# Patient Record
Sex: MALE | Race: OTHER | NOT HISPANIC OR LATINO | URBAN - METROPOLITAN AREA
[De-identification: names, ages, dates, MRNs, and addresses within clinical notes are randomized per-mention and may not be internally consistent; named-entity substitution may affect disease eponyms.]

---

## 2023-05-03 ENCOUNTER — EMERGENCY (EMERGENCY)
Facility: HOSPITAL | Age: 38
LOS: 1 days | Discharge: HOME | End: 2023-05-03
Attending: EMERGENCY MEDICINE | Admitting: EMERGENCY MEDICINE
Payer: SELF-PAY

## 2023-05-03 VITALS
DIASTOLIC BLOOD PRESSURE: 80 MMHG | HEART RATE: 79 BPM | SYSTOLIC BLOOD PRESSURE: 128 MMHG | TEMPERATURE: 98 F | RESPIRATION RATE: 17 BRPM | OXYGEN SATURATION: 99 %

## 2023-05-03 VITALS
HEART RATE: 95 BPM | DIASTOLIC BLOOD PRESSURE: 89 MMHG | SYSTOLIC BLOOD PRESSURE: 140 MMHG | OXYGEN SATURATION: 93 % | HEIGHT: 77 IN | RESPIRATION RATE: 18 BRPM | WEIGHT: 287.04 LBS | TEMPERATURE: 98 F

## 2023-05-03 LAB
ALBUMIN SERPL ELPH-MCNC: 3.7 G/DL — SIGNIFICANT CHANGE UP (ref 3.3–5)
ALP SERPL-CCNC: 64 U/L — SIGNIFICANT CHANGE UP (ref 40–120)
ALT FLD-CCNC: 32 U/L — SIGNIFICANT CHANGE UP (ref 10–45)
ANION GAP SERPL CALC-SCNC: 7 MMOL/L — SIGNIFICANT CHANGE UP (ref 5–17)
APPEARANCE UR: CLEAR — SIGNIFICANT CHANGE UP
AST SERPL-CCNC: 39 U/L — SIGNIFICANT CHANGE UP (ref 10–40)
B-OH-BUTYR SERPL-SCNC: 0.1 MMOL/L — SIGNIFICANT CHANGE UP
BASE EXCESS BLDV CALC-SCNC: 5.7 MMOL/L — HIGH (ref -2–3)
BASOPHILS # BLD AUTO: 0.02 K/UL — SIGNIFICANT CHANGE UP (ref 0–0.2)
BASOPHILS NFR BLD AUTO: 0.4 % — SIGNIFICANT CHANGE UP (ref 0–2)
BILIRUB SERPL-MCNC: 0.9 MG/DL — SIGNIFICANT CHANGE UP (ref 0.2–1.2)
BILIRUB UR-MCNC: NEGATIVE — SIGNIFICANT CHANGE UP
BUN SERPL-MCNC: 8 MG/DL — SIGNIFICANT CHANGE UP (ref 7–23)
CA-I SERPL-SCNC: 1.22 MMOL/L — SIGNIFICANT CHANGE UP (ref 1.15–1.33)
CALCIUM SERPL-MCNC: 9.2 MG/DL — SIGNIFICANT CHANGE UP (ref 8.4–10.5)
CHLORIDE SERPL-SCNC: 100 MMOL/L — SIGNIFICANT CHANGE UP (ref 96–108)
CO2 BLDV-SCNC: 33.7 MMOL/L — HIGH (ref 22–26)
CO2 SERPL-SCNC: 28 MMOL/L — SIGNIFICANT CHANGE UP (ref 22–31)
COLOR SPEC: YELLOW — SIGNIFICANT CHANGE UP
CREAT SERPL-MCNC: 0.93 MG/DL — SIGNIFICANT CHANGE UP (ref 0.5–1.3)
DIFF PNL FLD: NEGATIVE — SIGNIFICANT CHANGE UP
EGFR: 109 ML/MIN/1.73M2 — SIGNIFICANT CHANGE UP
EOSINOPHIL # BLD AUTO: 0.08 K/UL — SIGNIFICANT CHANGE UP (ref 0–0.5)
EOSINOPHIL NFR BLD AUTO: 1.5 % — SIGNIFICANT CHANGE UP (ref 0–6)
GAS PNL BLDV: 134 MMOL/L — LOW (ref 136–145)
GAS PNL BLDV: SIGNIFICANT CHANGE UP
GLUCOSE SERPL-MCNC: 328 MG/DL — HIGH (ref 70–99)
GLUCOSE UR QL: >=1000
HCO3 BLDV-SCNC: 32 MMOL/L — HIGH (ref 22–29)
HCT VFR BLD CALC: 35.4 % — LOW (ref 39–50)
HGB BLD-MCNC: 11.3 G/DL — LOW (ref 13–17)
IMM GRANULOCYTES NFR BLD AUTO: 0.2 % — SIGNIFICANT CHANGE UP (ref 0–0.9)
KETONES UR-MCNC: NEGATIVE — SIGNIFICANT CHANGE UP
LEUKOCYTE ESTERASE UR-ACNC: NEGATIVE — SIGNIFICANT CHANGE UP
LYMPHOCYTES # BLD AUTO: 2.14 K/UL — SIGNIFICANT CHANGE UP (ref 1–3.3)
LYMPHOCYTES # BLD AUTO: 39.4 % — SIGNIFICANT CHANGE UP (ref 13–44)
MCHC RBC-ENTMCNC: 29.3 PG — SIGNIFICANT CHANGE UP (ref 27–34)
MCHC RBC-ENTMCNC: 31.9 GM/DL — LOW (ref 32–36)
MCV RBC AUTO: 91.7 FL — SIGNIFICANT CHANGE UP (ref 80–100)
MONOCYTES # BLD AUTO: 0.34 K/UL — SIGNIFICANT CHANGE UP (ref 0–0.9)
MONOCYTES NFR BLD AUTO: 6.3 % — SIGNIFICANT CHANGE UP (ref 2–14)
NEUTROPHILS # BLD AUTO: 2.84 K/UL — SIGNIFICANT CHANGE UP (ref 1.8–7.4)
NEUTROPHILS NFR BLD AUTO: 52.2 % — SIGNIFICANT CHANGE UP (ref 43–77)
NITRITE UR-MCNC: NEGATIVE — SIGNIFICANT CHANGE UP
NRBC # BLD: 0 /100 WBCS — SIGNIFICANT CHANGE UP (ref 0–0)
PCO2 BLDV: 53 MMHG — SIGNIFICANT CHANGE UP (ref 42–55)
PH BLDV: 7.39 — SIGNIFICANT CHANGE UP (ref 7.32–7.43)
PH UR: 6 — SIGNIFICANT CHANGE UP (ref 5–8)
PLATELET # BLD AUTO: 216 K/UL — SIGNIFICANT CHANGE UP (ref 150–400)
PO2 BLDV: 27 MMHG — SIGNIFICANT CHANGE UP (ref 25–45)
POTASSIUM BLDV-SCNC: 3.7 MMOL/L — SIGNIFICANT CHANGE UP (ref 3.5–5.1)
POTASSIUM SERPL-MCNC: 3.8 MMOL/L — SIGNIFICANT CHANGE UP (ref 3.5–5.3)
POTASSIUM SERPL-SCNC: 3.8 MMOL/L — SIGNIFICANT CHANGE UP (ref 3.5–5.3)
PROT SERPL-MCNC: 7.7 G/DL — SIGNIFICANT CHANGE UP (ref 6–8.3)
PROT UR-MCNC: NEGATIVE MG/DL — SIGNIFICANT CHANGE UP
RBC # BLD: 3.86 M/UL — LOW (ref 4.2–5.8)
RBC # FLD: 13.1 % — SIGNIFICANT CHANGE UP (ref 10.3–14.5)
SAO2 % BLDV: SIGNIFICANT CHANGE UP % (ref 67–88)
SODIUM SERPL-SCNC: 135 MMOL/L — SIGNIFICANT CHANGE UP (ref 135–145)
SP GR SPEC: 1.02 — SIGNIFICANT CHANGE UP (ref 1–1.03)
UROBILINOGEN FLD QL: 0.2 E.U./DL — SIGNIFICANT CHANGE UP
WBC # BLD: 5.43 K/UL — SIGNIFICANT CHANGE UP (ref 3.8–10.5)
WBC # FLD AUTO: 5.43 K/UL — SIGNIFICANT CHANGE UP (ref 3.8–10.5)

## 2023-05-03 PROCEDURE — 82330 ASSAY OF CALCIUM: CPT

## 2023-05-03 PROCEDURE — 81003 URINALYSIS AUTO W/O SCOPE: CPT

## 2023-05-03 PROCEDURE — 99284 EMERGENCY DEPT VISIT MOD MDM: CPT

## 2023-05-03 PROCEDURE — 84295 ASSAY OF SERUM SODIUM: CPT

## 2023-05-03 PROCEDURE — 84132 ASSAY OF SERUM POTASSIUM: CPT

## 2023-05-03 PROCEDURE — 36415 COLL VENOUS BLD VENIPUNCTURE: CPT

## 2023-05-03 PROCEDURE — 87086 URINE CULTURE/COLONY COUNT: CPT

## 2023-05-03 PROCEDURE — 82010 KETONE BODYS QUAN: CPT

## 2023-05-03 PROCEDURE — 85025 COMPLETE CBC W/AUTO DIFF WBC: CPT

## 2023-05-03 PROCEDURE — 82803 BLOOD GASES ANY COMBINATION: CPT

## 2023-05-03 PROCEDURE — 80053 COMPREHEN METABOLIC PANEL: CPT

## 2023-05-03 PROCEDURE — 99283 EMERGENCY DEPT VISIT LOW MDM: CPT

## 2023-05-03 PROCEDURE — 82962 GLUCOSE BLOOD TEST: CPT

## 2023-05-03 RX ORDER — SODIUM CHLORIDE 9 MG/ML
1000 INJECTION INTRAMUSCULAR; INTRAVENOUS; SUBCUTANEOUS ONCE
Refills: 0 | Status: COMPLETED | OUTPATIENT
Start: 2023-05-03 | End: 2023-05-03

## 2023-05-03 RX ADMIN — SODIUM CHLORIDE 1000 MILLILITER(S): 9 INJECTION INTRAMUSCULAR; INTRAVENOUS; SUBCUTANEOUS at 19:11

## 2023-05-03 NOTE — ED PROVIDER NOTE - PROGRESS NOTE DETAILS
[place]: Hgb 11.3, glucose 328, other labs (including pH, bicarb, AG, BHB) wnl. UA w/ no ketones or infection. Feeling much better.

## 2023-05-03 NOTE — ED PROVIDER NOTE - NSFOLLOWUPINSTRUCTIONS_ED_ALL_ED_FT
Stay well hydrated.    Follow up with an endocrinologist. Can also call  to make an appointment.     Return for fevers, persistent vomit, worsening pain, worsening breathing, worsening lightheaded.    Follow up with primary doctor within 1-2 days.     Take your home medications!    Hyperglycemia    Hyperglycemia occurs when the glucose (sugar) level in your blood is too high. Symptoms include increased urination, increased thirst, a dry mouth, or changes in appetite. If started on a medication, take exactly as prescribed by your health care professional. Maintain a healthy lifestyle and follow up with your primary care physician.    SEEK IMMEDIATE MEDICAL CARE IF YOU HAVE ANY OF THE FOLLOWING SYMPTOMS: shortness of breath, change in mental status, nausea or vomiting, fruity smell to your breath, or any signs of dehydration.

## 2023-05-03 NOTE — ED PROVIDER NOTE - CLINICAL SUMMARY MEDICAL DECISION MAKING FREE TEXT BOX
38M PMH DM p/w high sugar. Pt states his sugar has been high. also states he feels shaky and is very hungry. No other systemic symptoms.   Sat 93%, other vitals wnl. Exam as above. Very well appearing.  ddx: Likely hyperglycemia, low suspicion DKA.   Clinically no specific toxidrome or withdrawal syndrome.   Labs, IVF, reassess.

## 2023-05-03 NOTE — ED ADULT NURSE REASSESSMENT NOTE - NS ED NURSE REASSESS COMMENT FT1
Patient made aware of return precautions verbalized understanding. Ambulated well without assistance.

## 2023-05-03 NOTE — ED PROVIDER NOTE - OBJECTIVE STATEMENT
38M PMH DM p/w high sugar. Pt states his sugar has been high. also states he feels shaky and is very hungry. No other systemic symptoms.   Etoh ~once a week, smokes, MJ.   Denies abd pain, fevers, chills, nausea, vomiting, diarrhea, black stool, bloody stool, dysuria, hematuria, urinary frequency, focal weakness, focal numbness, lightheadedness, back pain, SOB, CP, rhinorrhea, nasal congestion, sore throat, cough.

## 2023-05-03 NOTE — ED PROVIDER NOTE - PHYSICAL EXAMINATION
R eye has lateral gaze deviation - pt states this is his baseline  No clonus, rigidity, tremors, fasciculations. PERRL. Strength 5/5. Steady unassisted gait. Normal bowel sounds, skin temp/color.  Has old sticker marker on R AC (likely from recent IV)

## 2023-05-03 NOTE — ED PROVIDER NOTE - PATIENT PORTAL LINK FT
You can access the FollowMyHealth Patient Portal offered by [facility] by registering at the following website: [email or web address]. By joining "[employer]’s FollowMyHealth portal, you will also be able to view your health information using other applications (apps) compatible with our system.

## 2023-05-05 DIAGNOSIS — Z88.0 ALLERGY STATUS TO PENICILLIN: ICD-10-CM

## 2023-05-05 DIAGNOSIS — E11.65 TYPE 2 DIABETES MELLITUS WITH HYPERGLYCEMIA: ICD-10-CM

## 2023-05-05 DIAGNOSIS — R73.9 HYPERGLYCEMIA, UNSPECIFIED: ICD-10-CM

## 2023-05-05 LAB
CULTURE RESULTS: NO GROWTH — SIGNIFICANT CHANGE UP
SPECIMEN SOURCE: SIGNIFICANT CHANGE UP

## 2023-05-15 ENCOUNTER — EMERGENCY (EMERGENCY)
Facility: HOSPITAL | Age: 38
LOS: 1 days | Discharge: HOME | End: 2023-05-15
Attending: EMERGENCY MEDICINE | Admitting: EMERGENCY MEDICINE
Payer: SELF-PAY

## 2023-05-15 VITALS
HEART RATE: 88 BPM | RESPIRATION RATE: 18 BRPM | WEIGHT: 278 LBS | SYSTOLIC BLOOD PRESSURE: 155 MMHG | HEIGHT: 77 IN | OXYGEN SATURATION: 98 % | DIASTOLIC BLOOD PRESSURE: 102 MMHG | TEMPERATURE: 98 F

## 2023-05-15 VITALS
SYSTOLIC BLOOD PRESSURE: 139 MMHG | HEART RATE: 81 BPM | TEMPERATURE: 98 F | RESPIRATION RATE: 18 BRPM | DIASTOLIC BLOOD PRESSURE: 89 MMHG | OXYGEN SATURATION: 98 %

## 2023-05-15 DIAGNOSIS — E11.65 TYPE 2 DIABETES MELLITUS WITH HYPERGLYCEMIA: ICD-10-CM

## 2023-05-15 DIAGNOSIS — Z88.0 ALLERGY STATUS TO PENICILLIN: ICD-10-CM

## 2023-05-15 DIAGNOSIS — Z76.0 ENCOUNTER FOR ISSUE OF REPEAT PRESCRIPTION: ICD-10-CM

## 2023-05-15 DIAGNOSIS — T38.3X6A UNDERDOSING OF INSULIN AND ORAL HYPOGLYCEMIC [ANTIDIABETIC] DRUGS, INITIAL ENCOUNTER: ICD-10-CM

## 2023-05-15 LAB
ALBUMIN SERPL ELPH-MCNC: 3.7 G/DL — SIGNIFICANT CHANGE UP (ref 3.3–5)
ALP SERPL-CCNC: 62 U/L — SIGNIFICANT CHANGE UP (ref 40–120)
ALT FLD-CCNC: 57 U/L — HIGH (ref 10–45)
ANION GAP SERPL CALC-SCNC: 11 MMOL/L — SIGNIFICANT CHANGE UP (ref 5–17)
AST SERPL-CCNC: 62 U/L — HIGH (ref 10–40)
B-OH-BUTYR SERPL-SCNC: 0.3 MMOL/L — SIGNIFICANT CHANGE UP
BASE EXCESS BLDV CALC-SCNC: -0.8 MMOL/L — SIGNIFICANT CHANGE UP (ref -2–3)
BASOPHILS # BLD AUTO: 0.01 K/UL — SIGNIFICANT CHANGE UP (ref 0–0.2)
BASOPHILS NFR BLD AUTO: 0.2 % — SIGNIFICANT CHANGE UP (ref 0–2)
BILIRUB SERPL-MCNC: 0.5 MG/DL — SIGNIFICANT CHANGE UP (ref 0.2–1.2)
BUN SERPL-MCNC: 10 MG/DL — SIGNIFICANT CHANGE UP (ref 7–23)
CALCIUM SERPL-MCNC: 9 MG/DL — SIGNIFICANT CHANGE UP (ref 8.4–10.5)
CHLORIDE SERPL-SCNC: 95 MMOL/L — LOW (ref 96–108)
CO2 BLDV-SCNC: 26.3 MMOL/L — HIGH (ref 22–26)
CO2 SERPL-SCNC: 24 MMOL/L — SIGNIFICANT CHANGE UP (ref 22–31)
CREAT SERPL-MCNC: 0.82 MG/DL — SIGNIFICANT CHANGE UP (ref 0.5–1.3)
EGFR: 117 ML/MIN/1.73M2 — SIGNIFICANT CHANGE UP
EOSINOPHIL # BLD AUTO: 0.09 K/UL — SIGNIFICANT CHANGE UP (ref 0–0.5)
EOSINOPHIL NFR BLD AUTO: 1.7 % — SIGNIFICANT CHANGE UP (ref 0–6)
GLUCOSE SERPL-MCNC: 536 MG/DL — CRITICAL HIGH (ref 70–99)
HCO3 BLDV-SCNC: 25 MMOL/L — SIGNIFICANT CHANGE UP (ref 22–29)
HCT VFR BLD CALC: 35 % — LOW (ref 39–50)
HGB BLD-MCNC: 11.4 G/DL — LOW (ref 13–17)
IMM GRANULOCYTES NFR BLD AUTO: 0.2 % — SIGNIFICANT CHANGE UP (ref 0–0.9)
LYMPHOCYTES # BLD AUTO: 1.87 K/UL — SIGNIFICANT CHANGE UP (ref 1–3.3)
LYMPHOCYTES # BLD AUTO: 34.5 % — SIGNIFICANT CHANGE UP (ref 13–44)
MCHC RBC-ENTMCNC: 30.2 PG — SIGNIFICANT CHANGE UP (ref 27–34)
MCHC RBC-ENTMCNC: 32.6 GM/DL — SIGNIFICANT CHANGE UP (ref 32–36)
MCV RBC AUTO: 92.6 FL — SIGNIFICANT CHANGE UP (ref 80–100)
MONOCYTES # BLD AUTO: 0.35 K/UL — SIGNIFICANT CHANGE UP (ref 0–0.9)
MONOCYTES NFR BLD AUTO: 6.5 % — SIGNIFICANT CHANGE UP (ref 2–14)
NEUTROPHILS # BLD AUTO: 3.09 K/UL — SIGNIFICANT CHANGE UP (ref 1.8–7.4)
NEUTROPHILS NFR BLD AUTO: 56.9 % — SIGNIFICANT CHANGE UP (ref 43–77)
NRBC # BLD: 0 /100 WBCS — SIGNIFICANT CHANGE UP (ref 0–0)
PCO2 BLDV: 44 MMHG — SIGNIFICANT CHANGE UP (ref 42–55)
PH BLDV: 7.36 — SIGNIFICANT CHANGE UP (ref 7.32–7.43)
PLATELET # BLD AUTO: 201 K/UL — SIGNIFICANT CHANGE UP (ref 150–400)
PO2 BLDV: 51 MMHG — HIGH (ref 25–45)
POTASSIUM SERPL-MCNC: 4.5 MMOL/L — SIGNIFICANT CHANGE UP (ref 3.5–5.3)
POTASSIUM SERPL-SCNC: 4.5 MMOL/L — SIGNIFICANT CHANGE UP (ref 3.5–5.3)
PROT SERPL-MCNC: 7.2 G/DL — SIGNIFICANT CHANGE UP (ref 6–8.3)
RBC # BLD: 3.78 M/UL — LOW (ref 4.2–5.8)
RBC # FLD: 13.7 % — SIGNIFICANT CHANGE UP (ref 10.3–14.5)
SAO2 % BLDV: 81.4 % — SIGNIFICANT CHANGE UP (ref 67–88)
SODIUM SERPL-SCNC: 130 MMOL/L — LOW (ref 135–145)
WBC # BLD: 5.42 K/UL — SIGNIFICANT CHANGE UP (ref 3.8–10.5)
WBC # FLD AUTO: 5.42 K/UL — SIGNIFICANT CHANGE UP (ref 3.8–10.5)

## 2023-05-15 PROCEDURE — 82962 GLUCOSE BLOOD TEST: CPT

## 2023-05-15 PROCEDURE — 96376 TX/PRO/DX INJ SAME DRUG ADON: CPT

## 2023-05-15 PROCEDURE — 99285 EMERGENCY DEPT VISIT HI MDM: CPT

## 2023-05-15 PROCEDURE — 80053 COMPREHEN METABOLIC PANEL: CPT

## 2023-05-15 PROCEDURE — 36415 COLL VENOUS BLD VENIPUNCTURE: CPT

## 2023-05-15 PROCEDURE — 82803 BLOOD GASES ANY COMBINATION: CPT

## 2023-05-15 PROCEDURE — 99284 EMERGENCY DEPT VISIT MOD MDM: CPT | Mod: 25

## 2023-05-15 PROCEDURE — 96374 THER/PROPH/DIAG INJ IV PUSH: CPT

## 2023-05-15 PROCEDURE — 85025 COMPLETE CBC W/AUTO DIFF WBC: CPT

## 2023-05-15 PROCEDURE — 82010 KETONE BODYS QUAN: CPT

## 2023-05-15 RX ORDER — INSULIN HUMAN 100 [IU]/ML
10 INJECTION, SOLUTION SUBCUTANEOUS ONCE
Refills: 0 | Status: COMPLETED | OUTPATIENT
Start: 2023-05-15 | End: 2023-05-15

## 2023-05-15 RX ORDER — SODIUM CHLORIDE 9 MG/ML
1000 INJECTION INTRAMUSCULAR; INTRAVENOUS; SUBCUTANEOUS ONCE
Refills: 0 | Status: COMPLETED | OUTPATIENT
Start: 2023-05-15 | End: 2023-05-15

## 2023-05-15 RX ORDER — METFORMIN HYDROCHLORIDE 850 MG/1
1 TABLET ORAL
Qty: 40 | Refills: 0
Start: 2023-05-15 | End: 2023-06-03

## 2023-05-15 RX ORDER — INSULIN HUMAN 100 [IU]/ML
4 INJECTION, SOLUTION SUBCUTANEOUS ONCE
Refills: 0 | Status: COMPLETED | OUTPATIENT
Start: 2023-05-15 | End: 2023-05-15

## 2023-05-15 RX ORDER — INSULIN GLARGINE 100 [IU]/ML
40 INJECTION, SOLUTION SUBCUTANEOUS
Qty: 1 | Refills: 0
Start: 2023-05-15

## 2023-05-15 RX ADMIN — INSULIN HUMAN 4 UNIT(S): 100 INJECTION, SOLUTION SUBCUTANEOUS at 14:53

## 2023-05-15 RX ADMIN — SODIUM CHLORIDE 1000 MILLILITER(S): 9 INJECTION INTRAMUSCULAR; INTRAVENOUS; SUBCUTANEOUS at 12:54

## 2023-05-15 RX ADMIN — INSULIN HUMAN 10 UNIT(S): 100 INJECTION, SOLUTION SUBCUTANEOUS at 13:52

## 2023-05-15 NOTE — ED PROVIDER NOTE - PHYSICAL EXAMINATION
VITAL SIGNS: I have reviewed nursing notes and confirm.  CONSTITUTIONAL: Well-developed; well-nourished; in no acute distress.  SKIN: Agree with RN documentation regarding decubitus evaluation. Remainder of skin exam is warm and dry, no acute rash.  HEAD: Normocephalic; atraumatic.  EYES: PERRL, EOM intact; conjunctiva and sclera clear.  ENT: Mucous membranes mildly dry. No nasal discharge; airway clear.  NECK: Supple; non tender.  CARD: S1, S2 normal; no murmurs, gallops, or rubs. Regular rate and rhythm.  RESP: No wheezes, rales or rhonchi.  ABD: Normal bowel sounds; soft; non-distended; non-tender; no hepatosplenomegaly.  EXT: Normal ROM. No clubbing, cyanosis or edema.  LYMPH: No acute cervical adenopathy.  NEURO: Alert, oriented. Grossly unremarkable.  PSYCH: Cooperative, appropriate.

## 2023-05-15 NOTE — ED PROVIDER NOTE - CLINICAL SUMMARY MEDICAL DECISION MAKING FREE TEXT BOX
35 y/o M who ran out of medication. Pt with a fingerstick of 524 on ED arrival. Obtained labs, pt has no evidence of anion gap. pH of 7.3. Pt was given 2L of fluid and insulin. Glucose has been decreasing with the latest at 99. 35 y/o M who ran out of medication. Pt with a fingerstick of 524 on ED arrival. Obtained labs, pt has no evidence of anion gap. pH of 7.3. Pt was given 2L of fluid and insulin. Glucose gradually decreased in ED with latest POC <300.  Pt understands importance of taking medication as prescribed and follow up with PMD and endocrine - metformin and insulin sent to desired pharmacy.

## 2023-05-15 NOTE — ED ADULT TRIAGE NOTE - NS ED TRIAGE AVPU SCALE
Alert-The patient is alert, awake and responds to voice. The patient is oriented to time, place, and person. The triage nurse is able to obtain subjective information.
Abdomen soft, nontender, nondistended, bowel sounds present in all 4 quadrants.

## 2023-05-15 NOTE — ED ADULT NURSE NOTE - OBJECTIVE STATEMENT
37 yo M PMHx DM presents to ED asking for a medication refill 37 yo M PMHx DM presents to ED asking for a medication refill for his "diabetes medications." Pt awake and alert, AOx4. Pt reports that he was on a couple of different medications before he started getting injections, but that he wants to go back on the mediations because they were better at keeping his sugars under control. Pt reports increased urination. Pt denies headache, vision changes, CP, SOB, lightheadedness, dizziness, thirst.

## 2023-05-15 NOTE — ED PROVIDER NOTE - NSFOLLOWUPINSTRUCTIONS_ED_ALL_ED_FT
Take your metformin and insulin as prescribed.  Stay hydrated and avoid high sugary/carb foods.  Return to ED with any worsening symptoms or other concerns.      Hyperglycemia  Hyperglycemia occurs when the level of sugar (glucose) in the blood is too high. Glucose is a type of sugar that provides the body's main source of energy. Certain hormones (insulin and glucagon) control the level of glucose in the blood. Insulin lowers blood glucose, and glucagon increases blood glucose. Hyperglycemia can result from not having enough insulin in the bloodstream, or from the body not responding normally to insulin.    Hyperglycemia occurs most often in people who have diabetes (diabetes mellitus), but it can happen in people who do not have diabetes. It can develop quickly, and it can be life-threatening if it causes you to become severely dehydrated (diabetic ketoacidosis or hyperglycemic hyperosmolar state). Severe hyperglycemia is a medical emergency.    For most people with diabetes, a blood glucose level above 240 mg/dL is considered hyperglycemia.    What are the causes?  If you have diabetes, hyperglycemia may be caused by:  Medicines that increase blood glucose or affect your diabetes control.  Getting less physical activity.  Eating more than planned.  Being sick or injured, having an infection, or having surgery.  Stress.  Not giving yourself enough insulin (if you are taking insulin).  If you have undiagnosed diabetes, this may be the reason you have hyperglycemia.    If you do not have diabetes, hyperglycemia may be caused by:  Certain medicines, including:  Steroid medicines.  Beta-blockers.  Epinephrine.  Thiazide diuretics.  Stress.  Having a serious illness, an infection, or surgery.  Diseases of the pancreas.  What increases the risk?  Hyperglycemia is more likely to develop in people who have risk factors for diabetes, such as:  Having a family member with diabetes.  Certain conditions in which the body's disease-fighting system (immune system) attacks itself (autoimmune disorders).  Being overweight or obese.  Having an inactive (sedentary) lifestyle.  Having been diagnosed with insulin resistance.  Having a history of prediabetes, gestational diabetes, or polycystic ovarian syndrome (PCOS).  What are the signs or symptoms?  Hyperglycemia may not cause any symptoms. If you do have symptoms, they may include:  Increased thirst.  Needing to urinate more often than usual.  Hunger.  Feeling very tired.  Blurry vision.  Other symptoms may develop if hyperglycemia gets worse, such as:  Dry mouth.  Abdominal pain.  Loss of appetite.  Fruity-smelling breath.  Weakness.  Unexpected weight loss.  Tingling or numbness in the hands or feet.  Headache.  Cuts or bruises that are slow to heal.  How is this diagnosed?  Hyperglycemia is diagnosed with a blood test to measure your blood glucose level. This blood test is usually done while you are having symptoms. Your health care provider may also do a physical exam and review your medical history.    You may have more tests to determine the cause of your hyperglycemia, such as:  A fasting blood glucose (FBG) test. You will not be allowed to eat (you will fast) for at least 8 hours before a blood sample is taken.  An A1C blood test. This provides information about blood glucose control over the previous 2–3 months.  An oral glucose tolerance test (OGTT). This measures your blood glucose at two times:  After fasting. This is your baseline blood glucose level.  2 hours after drinking a beverage that contains glucose.  How is this treated?  Treatment depends on the cause of your hyperglycemia. Treatment may include:  Taking medicine to regulate your blood glucose levels. If you take insulin or other diabetes medicines, your medicine or dosage may be adjusted.  Lifestyle changes, such as exercising more, eating healthier foods, or losing weight.  Treating an illness or infection.  Checking your blood glucose more often.  Stopping or reducing steroid medicines.  If your hyperglycemia becomes severe and it results in diabetic ketoacidosis or hyperglycemic hyperosmolar state, you must be hospitalized and given IV fluids and IV insulin.    Follow these instructions at home:  General instructions    Take over-the-counter and prescription medicines only as told by your health care provider.  Do not use any products that contain nicotine or tobacco. These products include cigarettes, chewing tobacco, and vaping devices, such as e-cigarettes. If you need help quitting, ask your health care provider.  If you drink alcohol:  Limit how much you have to:  0–1 drink a day for women who are not pregnant.  0–2 drinks a day for men.  Know how much alcohol is in a drink. In the [place] one drink equals one 12 oz bottle of beer (355 mL), one 5 oz glass of wine (148 mL), or one 1½ oz glass of hard liquor (44 mL).  Learn to manage stress. If you need help with this, ask your health care provider.  Do exercises as told by your health care provider.  Keep all follow-up visits. This is important.  Eating and drinking      Maintain a healthy weight.  Stay hydrated, especially when you exercise, get sick, or spend time in hot temperatures.  Drink enough fluid to keep your urine pale yellow.  If you have diabetes:      Know the symptoms of hyperglycemia.  Follow your diabetes management plan as told by your health care provider. Make sure you:  Take your insulin and medicines as told.  Follow your exercise plan.  Follow your meal plan. Eat on time, and do not skip meals.  Check your blood glucose as often as told. Make sure to check your blood glucose before and after exercise. If you exercise longer or in a different way, check your blood glucose more often.  Follow your sick day plan whenever you cannot eat or drink normally. Make this plan in advance with your health care provider.  Share your diabetes management plan with people in your workplace, school, and household.  Check your urine for ketones when you are ill and as told by your health care provider.  Carry a medical alert card or wear medical alert jewelry.  Where to find more information  American Diabetes Association: www.diabetes.org    Contact a health care provider if:  Your blood glucose is at or above 240 mg/dL (13.3 mmol/L) for 2 days in a row.  You have problems keeping your blood glucose in your target range.  You have frequent episodes of hyperglycemia.  You have signs of illness, such as nausea, vomiting, or fever.  Get help right away if:  Your blood glucose monitor reads "high" even when you are taking insulin.  You have trouble breathing.  You have a change in how you think, feel, or act (mental status).  You have nausea or vomiting that does not go away.  These symptoms may represent a serious problem that is an emergency. Do not wait to see if the symptoms will go away. Get medical help right away. Call your local emergency services (911 in the [place] Do not drive yourself to the hospital.    Summary  Hyperglycemia occurs when the level of sugar (glucose) in the blood is too high.  Hyperglycemia can happen with or without diabetes, and severe hyperglycemia can be life-threatening.  Hyperglycemia is diagnosed with a blood test to measure your blood glucose level. This blood test is usually done while you are having symptoms. Your health care provider may also do a physical exam and review your medical history.  If you have diabetes, follow your diabetes management plan as told by your health care provider.  Contact your health care provider if you have problems keeping your blood glucose in your target range.  This information is not intended to replace advice given to you by your health care provider. Make sure you discuss any questions you have with your health care provider.    Document Revised: 10/01/2021 Document Reviewed: 10/01/2021

## 2023-05-15 NOTE — ED ADULT NURSE NOTE - NSFALLUNIVINTERV_ED_ALL_ED
Bed/Stretcher in lowest position, wheels locked, appropriate side rails in place/Call bell, personal items and telephone in reach/Instruct patient to call for assistance before getting out of bed/chair/stretcher/Non-slip footwear applied when patient is off stretcher/[place] to call system/Physically safe environment - no spills, clutter or unnecessary equipment/Purposeful proactive rounding/Room/bathroom lighting operational, light cord in reach

## 2023-05-15 NOTE — ED PROVIDER NOTE - PATIENT PORTAL LINK FT
You can access the FollowMyHealth Patient Portal offered by [facility] by registering at the following website: [email or web address]. By joining [employer]’s FollowMyHealth portal, you will also be able to view your health information using other applications (apps) compatible with our system.

## 2023-05-15 NOTE — ED PROVIDER NOTE - OBJECTIVE STATEMENT
37 y/o M with a PMHx of DM presenting to the ED because he ran out of his medication last week. On ED arrival pt had a fingerstick higher than 500. Pt is now c/o increased urination but has no other symptoms. Pt denies any fevers or chills.

## 2023-05-16 ENCOUNTER — EMERGENCY (EMERGENCY)
Facility: HOSPITAL | Age: 38
LOS: 1 days | Discharge: HOME | End: 2023-05-16
Attending: EMERGENCY MEDICINE
Payer: SELF-PAY

## 2023-05-16 VITALS
DIASTOLIC BLOOD PRESSURE: 62 MMHG | RESPIRATION RATE: 18 BRPM | SYSTOLIC BLOOD PRESSURE: 118 MMHG | HEART RATE: 75 BPM | OXYGEN SATURATION: 99 % | TEMPERATURE: 98 F

## 2023-05-16 VITALS
DIASTOLIC BLOOD PRESSURE: 85 MMHG | OXYGEN SATURATION: 97 % | HEIGHT: 76 IN | TEMPERATURE: 98 F | HEART RATE: 83 BPM | SYSTOLIC BLOOD PRESSURE: 132 MMHG | RESPIRATION RATE: 18 BRPM | WEIGHT: 278 LBS

## 2023-05-16 PROCEDURE — 99053 MED SERV 10PM-8AM 24 HR FAC: CPT

## 2023-05-16 PROCEDURE — 99284 EMERGENCY DEPT VISIT MOD MDM: CPT

## 2023-05-16 PROCEDURE — 73610 X-RAY EXAM OF ANKLE: CPT | Mod: 26,RT

## 2023-05-16 PROCEDURE — 99283 EMERGENCY DEPT VISIT LOW MDM: CPT | Mod: 25

## 2023-05-16 PROCEDURE — 73610 X-RAY EXAM OF ANKLE: CPT

## 2023-05-16 RX ORDER — IBUPROFEN 200 MG
600 TABLET ORAL ONCE
Refills: 0 | Status: COMPLETED | OUTPATIENT
Start: 2023-05-16 | End: 2023-05-16

## 2023-05-16 RX ORDER — IBUPROFEN 200 MG
1 TABLET ORAL
Qty: 20 | Refills: 0
Start: 2023-05-16

## 2023-05-16 RX ADMIN — Medication 600 MILLIGRAM(S): at 04:49

## 2023-05-16 RX ADMIN — Medication 600 MILLIGRAM(S): at 05:20

## 2023-05-16 NOTE — ED PROVIDER NOTE - CLINICAL SUMMARY MEDICAL DECISION MAKING FREE TEXT BOX
Patient is neuro vastly intact in right ankle brace.  Patient able to ambulate and bear weight.  Patient refuses cane or crutches.  Contact provided for podiatry follow-up.  Return precautions explained questions answered.  Pt is well appearing, has no new complaints and able to walk with normal gait. Pt is stable for discharge and follow up with medical doctor. Pt educated on care and need for follow up. Discussed anticipatory guidance and return precautions. Questions answered. I had a detailed discussion with the patient regarding the historical points, exam findings, and any diagnostic results supporting the discharge diagnosis.

## 2023-05-16 NOTE — ED PROVIDER NOTE - NSFOLLOWUPINSTRUCTIONS_ED_ALL_ED_FT
An ankle sprain is a stretch or tear in a ligament in the ankle. Ligaments are tissues that connect bones to each other.    The two most common types of ankle sprains are:  Inversion sprain. This happens when the foot turns inward and the ankle rolls outward. It affects the ligament on the outside of the foot (lateral ligament).  Eversion sprain. This happens when the foot turns outward and the ankle rolls inward. It affects the ligament on the inner side of the foot (medial ligament).  What are the causes?  This condition is often caused by accidentally rolling or twisting the ankle.    What increases the risk?  You are more likely to develop this condition if you play sports.    What are the signs or symptoms?  Comparison of a normal ankle and an ankle that is swollen and bruised.  Symptoms of this condition include:  Pain in your ankle.  Swelling.  Bruising. This may develop right after you sprain your ankle or 1–2 days later.  Trouble standing or walking, especially when you turn or change directions.  How is this diagnosed?  This condition is diagnosed with:  A physical exam. During the exam, your health care provider will press on certain parts of your foot and ankle and try to move them in certain ways.  X-ray imaging. These may be taken to see how severe the sprain is and to check for broken bones.  How is this treated?  This condition may be treated with:  A brace or splint. This is used to keep the ankle from moving until it heals.  An elastic bandage. This is used to support the ankle.  Crutches.  Pain medicine.  Surgery. This may be needed if the sprain is severe.  Physical therapy. This may help to improve the range of motion in the ankle.  Follow these instructions at home:  If you have a brace or a splint:    Wear the brace or splint as told by your health care provider. Remove it only as told by your health care provider.  Loosen the brace or splint if your toes tingle, become numb, or turn cold and blue.  Keep the brace or splint clean.  If the brace or splint is not waterproof:  Do not let it get wet.  Cover it with a watertight covering when you take a bath or a shower.  If you have an elastic bandage (dressing):    Remove it to shower or bathe.  Try not to move your ankle much, but wiggle your toes from time to time. This helps to prevent swelling.  Adjust the dressing to make it more comfortable if it feels too tight.  Loosen the dressing if you have numbness or tingling in your foot, or if your foot becomes cold and blue.  Managing pain, stiffness, and swelling    A bag of ice on a towel on the skin.  Take over-the-counter and prescription medicines only as told by your health care provider.  For 2–3 days, keep your ankle raised (elevated) above the level of your heart as much as possible.  If directed, put ice on the injured area:  If you have a removable brace or splint, remove it as told by your health care provider.  Put ice in a plastic bag.  Place a towel between your skin and the bag.  Leave the ice on for 20 minutes, 2–3 times a day.  General instructions    Rest your ankle.  Do not use the injured limb to support your body weight until your health care provider says that you can. Use crutches as told by your health care provider.  Do not use any products that contain nicotine or tobacco, such as cigarettes, e-cigarettes, and chewing tobacco. If you need help quitting, ask your health care provider.  Keep all follow-up visits as told by your health care provider. This is important.  Contact a health care provider if:  You have rapidly increasing bruising or swelling.  Your pain is not relieved with medicine.  Get help right away if:  Your foot or toes become numb or blue.  You have severe pain that gets worse.  Summary  An ankle sprain is a stretch or tear in a ligament in the ankle. Ligaments are tissues that connect bones to each other.  This condition is often caused by accidentally rolling or twisting the ankle.  Symptoms include pain, swelling, bruising, and trouble walking.  To relieve pain and swelling, put ice on the affected ankle, raise your ankle above the level of your heart, and use an elastic bandage.  Keep all follow-up visits as told by your health care provider. This is important.  This information is not intended to replace advice given to you by your health care provider. Make sure you discuss any questions you have with your health care provider.

## 2023-05-16 NOTE — ED PROVIDER NOTE - NSFOLLOWUPCLINICS_GEN_ALL_ED_FT
[facility]  Podiatry/Wound Care  [address] [place] [address]  Phone: [phone]  Fax: [phone]

## 2023-05-16 NOTE — ED PROVIDER NOTE - OBJECTIVE STATEMENT
38-year-old male states twisted right ankle yesterday.  Patient describes mechanical accident.  No head trauma or LOC.  Patient with tenderness to right lateral malleoli are area.

## 2023-05-16 NOTE — ED ADULT NURSE NOTE - NSFALLUNIVINTERV_ED_ALL_ED
Bed/Stretcher in lowest position, wheels locked, appropriate side rails in place/Call bell, personal items and telephone in reach/Instruct patient to call for assistance before getting out of bed/chair/stretcher/Non-slip footwear applied when patient is off stretcher/[place] to call system/Physically safe environment - no spills, clutter or unnecessary equipment/Purposeful proactive rounding/Room/bathroom lighting operational, light cord in reach

## 2023-05-16 NOTE — ED PROVIDER NOTE - PATIENT PORTAL LINK FT
You can access the FollowMyHealth Patient Portal offered by [facility] by registering at the following website: [email or web address]. By joining [employer]’s FollowMyHealth portal, you will also be able to view your health information using other applications (apps) compatible with our system.

## 2023-05-16 NOTE — ED PROVIDER NOTE - CPE EDP HEME LYMPH NORM
left tonsil abcess X4days. Went to urgent care yesterday and sent to ER  Ibuprofen 1000mg taken around 2230 normal...

## 2023-05-16 NOTE — ED PROVIDER NOTE - PHYSICAL EXAMINATION
+Right lateral malleolar tenderness no bony deformities, anterior and posterior drawer test negative, pedal pulses intact, cap refill < 2 secs.

## 2024-06-13 NOTE — ED ADULT NURSE NOTE - CAS EDN DISCHARGE INTERVENTIONS
PA for Fluoxetine (Prozac) 20 mg tablet Approved     Date(s) approved 5- - 6-        Patient advised by          [x] [employer] Message  [] Phone call   []LMOM  []L/M to call office as no active Communication consent on file  []Unable to leave detailed message as VM not approved on Communication consent       Pharmacy advised by    [x]Fax  []Phone call    Approval letter scanned into Media no not available at this time       IV discontinued, cath removed intact